# Patient Record
Sex: MALE | Race: BLACK OR AFRICAN AMERICAN | NOT HISPANIC OR LATINO | URBAN - METROPOLITAN AREA
[De-identification: names, ages, dates, MRNs, and addresses within clinical notes are randomized per-mention and may not be internally consistent; named-entity substitution may affect disease eponyms.]

---

## 2023-03-01 ENCOUNTER — EMERGENCY (EMERGENCY)
Facility: HOSPITAL | Age: 28
LOS: 0 days | Discharge: HOME | End: 2023-03-01
Payer: OTHER MISCELLANEOUS

## 2023-03-01 VITALS
HEIGHT: 73 IN | DIASTOLIC BLOOD PRESSURE: 77 MMHG | WEIGHT: 250 LBS | TEMPERATURE: 98 F | SYSTOLIC BLOOD PRESSURE: 130 MMHG | HEART RATE: 72 BPM | RESPIRATION RATE: 19 BRPM | OXYGEN SATURATION: 97 %
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
## 2023-03-01 DIAGNOSIS — M25.531 PAIN IN RIGHT WRIST: ICD-10-CM
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
## 2023-03-01 DIAGNOSIS — M54.50 LOW BACK PAIN, UNSPECIFIED: ICD-10-CM
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
## 2023-03-01 DIAGNOSIS — M25.521 PAIN IN RIGHT ELBOW: ICD-10-CM

## 2023-03-01 DIAGNOSIS — M25.511 PAIN IN RIGHT SHOULDER: ICD-10-CM

## 2023-03-01 PROCEDURE — 73080 X-RAY EXAM OF ELBOW: CPT | Mod: 26,RT

## 2023-03-01 PROCEDURE — 99284 EMERGENCY DEPT VISIT MOD MDM: CPT

## 2023-03-01 PROCEDURE — 73030 X-RAY EXAM OF SHOULDER: CPT | Mod: 26,RT

## 2023-03-01 RX ORDER — ACETAMINOPHEN 500 MG
650 TABLET ORAL ONCE
Refills: 0 | Status: COMPLETED | OUTPATIENT
Start: 2023-03-01 | End: 2023-03-01

## 2023-03-01 RX ORDER — LIDOCAINE 4 G/100G
1 CREAM TOPICAL ONCE
Refills: 0 | Status: COMPLETED | OUTPATIENT
Start: 2023-03-01 | End: 2023-03-01

## 2023-03-01 RX ORDER — KETOROLAC TROMETHAMINE 30 MG/ML
30 SYRINGE (ML) INJECTION ONCE
Refills: 0 | Status: DISCONTINUED | OUTPATIENT
Start: 2023-03-01 | End: 2023-03-01

## 2023-03-01 RX ADMIN — LIDOCAINE 1 PATCH: 4 CREAM TOPICAL at 15:59

## 2023-03-01 RX ADMIN — Medication 650 MILLIGRAM(S): at 14:46

## 2023-03-01 RX ADMIN — Medication 30 MILLIGRAM(S): at 14:47

## 2023-03-01 NOTE — ED ADULT NURSE NOTE - NSIMPLEMENTINTERV_GEN_ALL_ED
Implemented All Fall Risk Interventions:  [place] to call system. Call bell, personal items and telephone within reach. Instruct patient to call for assistance. Room bathroom lighting operational. Non-slip footwear when patient is off stretcher. Physically safe environment: no spills, clutter or unnecessary equipment. Stretcher in lowest position, wheels locked, appropriate side rails in place. Provide visual cue, wrist band, yellow gown, etc. Monitor gait and stability. Monitor for mental status changes and reorient to person, place, and time. Review medications for side effects contributing to fall risk. Reinforce activity limits and safety measures with patient and family.

## 2023-03-01 NOTE — ED ADULT NURSE NOTE - OBJECTIVE STATEMENT
received er chair 6 c/o pain r shoulder r elbow r wrist and r lower back s/p slip and fall at work denies head injury or loc no swelling or deformity noted r fingers warm pink and mobile with brisk capillary refill and + sensation present full rom r upper extremity but with worse pain upon position injury occurred while pt was at work

## 2023-03-01 NOTE — ED PROVIDER NOTE - PATIENT PORTAL LINK FT
You can access the FollowMyHealth Patient Portal offered by [facility] by registering at the following website: [email or web address]. By joining [employer]’s FollowMyHealth portal, you will also be able to view your health information using other applications (apps) compatible with our system.

## 2023-03-01 NOTE — ED PROVIDER NOTE - NSFOLLOWUPINSTRUCTIONS_ED_ALL_ED_FT
You were seen today in the ER for shoulder and elbow pain after a slip and fall. The preliminary read on your xrays were negative for acute fracture or dislocation, if anything changes you will receive a phone call. Please avoid heavy lifting until symptoms improve. A lidocaine patch was placed for pain and can remain on for 12 hours.     Take Motrin 600 mg every 8 hours as needed for moderate pain-- take with food..    Take Tylenol 650mg (Two 325 mg pills) every 4-6 hours as needed for pain.    Rest, Ice, Elevate injured area    Return to Emergency room for any worsening or persistent pain, weakness, numbness, fever, color change to extremity, or any other concerning symptoms.    Advance activity as tolerated.     Follow up with your primary care physician in 48-72 hours- bring copies of your results. You were seen today in the ER for shoulder and elbow pain after a slip and fall.     The preliminary read on your xrays were negative for acute fracture or dislocation, if anything changes you will receive a phone call.     Please avoid heavy lifting until symptoms improve.     A lidocaine patch was placed for pain and can remain on for 12 hours.     Take Motrin 600 mg every 8 hours as needed for moderate pain-- take with food..    Take Tylenol 650mg (Two 325 mg pills) every 4-6 hours as needed for pain.    Rest, Ice, Elevate injured area    Return to Emergency room for any worsening or persistent pain, weakness, numbness, fever, color change to extremity, or any other concerning symptoms.    Advance activity as tolerated.     Follow up with your primary care physician in 48-72 hours- bring copies of your results.    If symptoms persist, you can follow up with orthopedics. See below for referral.

## 2023-03-01 NOTE — ED ADULT TRIAGE NOTE - CHIEF COMPLAINT QUOTE
Pt s/p slipped and fell on icy surface at work x today , pt c/o R shoulder,  R arm, R wrist, and lower back pain . denies LOC

## 2023-03-01 NOTE — ED PROVIDER NOTE - OBJECTIVE STATEMENT
Patient is 26yo M no significant PHMx presents with c/o right shoulder, elbow and wrist pain s/p slip and fall on ice at work. Patient also c/o right low back pain. Patient denies head strike or LOC, HA, dizziness, neck pain, CP, SOB, leg pain. Patient reports pain is mostly right anterior shoulder, worse with movement. Patient is 28yo M no significant PHMx presents with c/o right shoulder, elbow and wrist pain s/p slip and fall on ice at work. Patient also c/o right low back pain. Patient denies head strike or LOC, HA, dizziness, neck pain, CP, SOB, leg pain. Patient reports pain is mostly right anterior shoulder, worse with movement. Denies bowel or bladder incontinence or retention, saddle anesthesia, numbness, tingling or inability to ambulate.

## 2023-03-01 NOTE — ED ADULT NURSE NOTE - WAS YOUR LAST COVID-19 VACCINE GREATER THAN OR EQUAL TO TWO MONTHS AGO?
RN contacted patient and informed her message has been sent to MD to review CT for upcoming surgery. Will contact her with final results and recommendations. Patient verbalized understanding. Advised patient to call with questions, concerns, or changes in symptoms.      No

## 2023-03-01 NOTE — ED PROVIDER NOTE - CLINICAL SUMMARY MEDICAL DECISION MAKING FREE TEXT BOX
Patient is 26yo M no significant PHMx presents with c/o right shoulder, elbow and wrist pain, right mid low back pain s/p slip and fall on ice at work. Patient denies head strike or LOC, HA, dizziness, neck pain, CP, SOB, leg pain, urinary complaints or hematuria. Vitals sings stable. Physical exam pertinent for right anterior/lateral shoulder tenderness, patient with full ROM of wrist, elbow, and shoulder. No ecchymosis noted. Patient also with right lumbar paraspinal/flank tenderness. Concern for MSK injury vs contusion, less concern for bony injury or snuff box injury in setting of no hand pain, Full ROM. Will order right shoulder and elbow xray, PO Tylenol and IM Ketoralac for pain, lidocaine patch, and reassess. Patient likely to be discharged home with PMD follow up. Patient is 28yo M no significant PHMx presents with c/o right shoulder, elbow and wrist pain, right mid low back pain s/p slip and fall on ice at work. Patient denies head strike or LOC, HA, dizziness, neck pain, CP, SOB, leg pain, urinary complaints or hematuria. Vitals sings stable. Physical exam pertinent for right anterior/lateral shoulder tenderness, patient with full ROM of wrist, elbow, and shoulder. No ecchymosis noted. Patient also with right lumbar paraspinal tenderness. Concern for MSK injury vs contusion, less concern for bony injury, no snuff box tenderness, Full ROM. Will order right shoulder and elbow xray, PO Tylenol and IM Ketoralac for pain, lidocaine patch, and reassess. Patient likely to be discharged home with PMD follow up.

## 2023-03-01 NOTE — ED PROVIDER NOTE - CARE PROVIDER_API CALL
Timmy Rojas)  [facility]  [address] [place] [address], [place] [address]  Phone: [phone]  Fax: [phone]  Follow Up Time:

## 2023-03-01 NOTE — ED PROVIDER NOTE - NSFOLLOWUPCLINICS_GEN_ALL_ED_FT
[facility]  Orthopedic Surgery  [address], 3rd & 4th [place] [address]  Phone: [phone]  Fax:     [facility]  Orthopedic Surgery  [address] [address]  Phone: [phone]  Fax:

## 2024-07-29 ENCOUNTER — EMERGENCY (EMERGENCY)
Facility: HOSPITAL | Age: 29
LOS: 1 days | Discharge: HOME | End: 2024-07-29
Attending: STUDENT IN AN ORGANIZED HEALTH CARE EDUCATION/TRAINING PROGRAM
Payer: SELF-PAY

## 2024-07-29 VITALS
OXYGEN SATURATION: 96 % | SYSTOLIC BLOOD PRESSURE: 135 MMHG | DIASTOLIC BLOOD PRESSURE: 76 MMHG | TEMPERATURE: 98 F | HEART RATE: 62 BPM | RESPIRATION RATE: 18 BRPM

## 2024-07-29 VITALS
HEIGHT: 74 IN | HEART RATE: 68 BPM | TEMPERATURE: 98 F | WEIGHT: 250 LBS | SYSTOLIC BLOOD PRESSURE: 141 MMHG | OXYGEN SATURATION: 94 % | RESPIRATION RATE: 20 BRPM | DIASTOLIC BLOOD PRESSURE: 82 MMHG

## 2024-07-29 RX ORDER — LIDOCAINE HCL 28 MG/G
1 GEL TOPICAL ONCE
Refills: 0 | Status: COMPLETED | OUTPATIENT
Start: 2024-07-29 | End: 2024-07-29

## 2024-07-29 RX ORDER — ACETAMINOPHEN 325 MG
975 TABLET ORAL ONCE
Refills: 0 | Status: COMPLETED | OUTPATIENT
Start: 2024-07-29 | End: 2024-07-29

## 2024-07-29 RX ADMIN — Medication 600 MILLIGRAM(S): at 02:27

## 2024-07-29 RX ADMIN — LIDOCAINE HCL 1 PATCH: 28 GEL TOPICAL at 02:27

## 2024-07-29 RX ADMIN — Medication 10 MILLIGRAM(S): at 02:27

## 2024-07-29 RX ADMIN — Medication 975 MILLIGRAM(S): at 02:06
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
## 2024-07-29 NOTE — ED PROVIDER NOTE - PHYSICAL EXAMINATION
GENERAL: NAD, lying in bed comfortably, C-collar in place   HEAD:  Atraumatic, Normocephalic  EYES: EOMI, PERRLA, conjunctiva and sclera clear  ENT: Moist mucous membranes  NECK: Tenderness just lateral to C1 on left side, no midline tenderness   CHEST/LUNG: Clear to auscultation bilaterally  HEART: Regular rate and rhythm  ABDOMEN: Soft, nontender, nondistended  EXTREMITIES:  2+ Peripheral Pulses, brisk capillary refill  NERVOUS SYSTEM:  A&Ox3, no focal deficits   SKIN: No rashes or lesions
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
## 2024-07-29 NOTE — ED PROVIDER NOTE - PATIENT PORTAL LINK FT
You can access the FollowMyHealth Patient Portal offered by [facility] by registering at the following website: [email or web address]. By joining [employer]’s FollowMyHealth portal, you will also be able to view your health information using other applications (apps) compatible with our system.

## 2025-02-08 NOTE — ED PROVIDER NOTE - WR ORDER DATE AND TIME 2
Copied from CRM #[id number]. Topic: MW Clinical Concerns - MW Medical Question  >> Feb 8, 2025  2:59 PM Connie GAR wrote:  JOI called during After Hrs to ask questions about results.  Selected 'Wrap Up CRM' and created new Telephone Encounter after clicking 'Convert to Clinical Call'. Selected Reason for Call 'Results'. Sent Pt Message template and routed as routine priority per Care Site Dept KB page for Routine Triage After Hrs support to appropriate clinical pool.Readback full message.    -- DO NOT REPLY / DO NOT REPLY ALL --  -- This inbox is not monitored. If this was sent to the wrong provider or department, reroute message to P ECO Reroute pool. --  -- Message is from Engagement Center Operations (ECO) --    General Patient Message: MOM CALLING due to the results she read on the live well elizabeth ,  2 questions regarding patients diagnoses and medication mom would like for the patient to be prescribed the tamiflu and for it to be sent too [id number] on [facility] .     Please advice    Caller Information       Contact Date/Time Type Contact Phone/Fax    02/08/2025 02:55 PM CST Phone (Incoming) [phone]            Alternative phone number: na     Can a detailed message be left? Yes - LiveWell Message  and Yes - Voicemail   Patient has been advised the message will be addressed within 2-3 business days.                 01-Mar-2023 14:25

## 2025-04-07 ENCOUNTER — TRANSCRIPTION ENCOUNTER (OUTPATIENT)
Age: 30
End: 2025-04-07

## 2025-04-07 ENCOUNTER — INPATIENT (INPATIENT)
Facility: HOSPITAL | Age: 30
LOS: 2 days | Discharge: HOME | End: 2025-04-10
Attending: STUDENT IN AN ORGANIZED HEALTH CARE EDUCATION/TRAINING PROGRAM | Admitting: STUDENT IN AN ORGANIZED HEALTH CARE EDUCATION/TRAINING PROGRAM
Payer: OTHER MISCELLANEOUS

## 2025-04-07 VITALS
SYSTOLIC BLOOD PRESSURE: 128 MMHG | TEMPERATURE: 98 F | OXYGEN SATURATION: 97 % | HEART RATE: 63 BPM | DIASTOLIC BLOOD PRESSURE: 77 MMHG | WEIGHT: 259.93 LBS | RESPIRATION RATE: 17 BRPM

## 2025-04-07 DIAGNOSIS — S02.402A ZYGOMATIC FRACTURE, UNSPECIFIED SIDE, INITIAL ENCOUNTER FOR CLOSED FRACTURE: ICD-10-CM

## 2025-04-07 LAB
ALBUMIN SERPL ELPH-MCNC: 4.1 G/DL — SIGNIFICANT CHANGE UP (ref 3.3–5)
ALP SERPL-CCNC: 84 U/L — SIGNIFICANT CHANGE UP (ref 40–120)
ALT FLD-CCNC: 29 U/L — SIGNIFICANT CHANGE UP (ref 4–41)
ANION GAP SERPL CALC-SCNC: 11 MMOL/L — SIGNIFICANT CHANGE UP (ref 7–14)
APTT BLD: 34.4 SEC — SIGNIFICANT CHANGE UP (ref 24.5–35.6)
AST SERPL-CCNC: 27 U/L — SIGNIFICANT CHANGE UP (ref 4–40)
BASOPHILS # BLD AUTO: 0.05 K/UL — SIGNIFICANT CHANGE UP (ref 0–0.2)
BASOPHILS NFR BLD AUTO: 1 % — SIGNIFICANT CHANGE UP (ref 0–2)
BILIRUB SERPL-MCNC: 0.4 MG/DL — SIGNIFICANT CHANGE UP (ref 0.2–1.2)
BUN SERPL-MCNC: 12 MG/DL — SIGNIFICANT CHANGE UP (ref 7–23)
CALCIUM SERPL-MCNC: 9.8 MG/DL — SIGNIFICANT CHANGE UP (ref 8.4–10.5)
CHLORIDE SERPL-SCNC: 102 MMOL/L — SIGNIFICANT CHANGE UP (ref 98–107)
CO2 SERPL-SCNC: 27 MMOL/L — SIGNIFICANT CHANGE UP (ref 22–31)
CREAT SERPL-MCNC: 1.2 MG/DL — SIGNIFICANT CHANGE UP (ref 0.5–1.3)
EGFR: 84 ML/MIN/1.73M2 — SIGNIFICANT CHANGE UP
EGFR: 84 ML/MIN/1.73M2 — SIGNIFICANT CHANGE UP
EOSINOPHIL # BLD AUTO: 0.06 K/UL — SIGNIFICANT CHANGE UP (ref 0–0.5)
EOSINOPHIL NFR BLD AUTO: 1.2 % — SIGNIFICANT CHANGE UP (ref 0–6)
GLUCOSE SERPL-MCNC: 108 MG/DL — HIGH (ref 70–99)
HCT VFR BLD CALC: 47.6 % — SIGNIFICANT CHANGE UP (ref 39–50)
HGB BLD-MCNC: 15.8 G/DL — SIGNIFICANT CHANGE UP (ref 13–17)
IANC: 2.64 K/UL — SIGNIFICANT CHANGE UP (ref 1.8–7.4)
IMM GRANULOCYTES NFR BLD AUTO: 0.2 % — SIGNIFICANT CHANGE UP (ref 0–0.9)
INR BLD: 1.05 RATIO — SIGNIFICANT CHANGE UP (ref 0.85–1.16)
LYMPHOCYTES # BLD AUTO: 1.78 K/UL — SIGNIFICANT CHANGE UP (ref 1–3.3)
LYMPHOCYTES # BLD AUTO: 36 % — SIGNIFICANT CHANGE UP (ref 13–44)
MCHC RBC-ENTMCNC: 28.6 PG — SIGNIFICANT CHANGE UP (ref 27–34)
MCHC RBC-ENTMCNC: 33.2 G/DL — SIGNIFICANT CHANGE UP (ref 32–36)
MCV RBC AUTO: 86.1 FL — SIGNIFICANT CHANGE UP (ref 80–100)
MONOCYTES # BLD AUTO: 0.4 K/UL — SIGNIFICANT CHANGE UP (ref 0–0.9)
MONOCYTES NFR BLD AUTO: 8.1 % — SIGNIFICANT CHANGE UP (ref 2–14)
NEUTROPHILS # BLD AUTO: 2.64 K/UL — SIGNIFICANT CHANGE UP (ref 1.8–7.4)
NEUTROPHILS NFR BLD AUTO: 53.5 % — SIGNIFICANT CHANGE UP (ref 43–77)
NRBC # BLD AUTO: 0 K/UL — SIGNIFICANT CHANGE UP (ref 0–0)
NRBC # FLD: 0 K/UL — SIGNIFICANT CHANGE UP (ref 0–0)
NRBC BLD AUTO-RTO: 0 /100 WBCS — SIGNIFICANT CHANGE UP (ref 0–0)
PLATELET # BLD AUTO: 256 K/UL — SIGNIFICANT CHANGE UP (ref 150–400)
POTASSIUM SERPL-MCNC: 4 MMOL/L — SIGNIFICANT CHANGE UP (ref 3.5–5.3)
POTASSIUM SERPL-SCNC: 4 MMOL/L — SIGNIFICANT CHANGE UP (ref 3.5–5.3)
PROT SERPL-MCNC: 7.1 G/DL — SIGNIFICANT CHANGE UP (ref 6–8.3)
PROTHROM AB SERPL-ACNC: 12.2 SEC — SIGNIFICANT CHANGE UP (ref 9.9–13.4)
RBC # BLD: 5.53 M/UL — SIGNIFICANT CHANGE UP (ref 4.2–5.8)
RBC # FLD: 12.3 % — SIGNIFICANT CHANGE UP (ref 10.3–14.5)
SODIUM SERPL-SCNC: 140 MMOL/L — SIGNIFICANT CHANGE UP (ref 135–145)
WBC # BLD: 4.94 K/UL — SIGNIFICANT CHANGE UP (ref 3.8–10.5)
WBC # FLD AUTO: 4.94 K/UL — SIGNIFICANT CHANGE UP (ref 3.8–10.5)

## 2025-04-07 PROCEDURE — 72125 CT NECK SPINE W/O DYE: CPT | Mod: 26

## 2025-04-07 PROCEDURE — 70486 CT MAXILLOFACIAL W/O DYE: CPT | Mod: 26

## 2025-04-07 PROCEDURE — 70450 CT HEAD/BRAIN W/O DYE: CPT | Mod: 26

## 2025-04-07 PROCEDURE — 99285 EMERGENCY DEPT VISIT HI MDM: CPT

## 2025-04-07 RX ORDER — DEXAMETHASONE 0.5 MG/1
12 TABLET ORAL ONCE
Refills: 0 | Status: COMPLETED | OUTPATIENT
Start: 2025-04-07 | End: 2025-04-08

## 2025-04-07 RX ORDER — SODIUM CHLORIDE 9 G/1000ML
1000 INJECTION, SOLUTION INTRAVENOUS
Refills: 0 | Status: DISCONTINUED | OUTPATIENT
Start: 2025-04-07 | End: 2025-04-10

## 2025-04-07 RX ORDER — AMPICILLIN SODIUM AND SULBACTAM SODIUM 1; .5 G/1; G/1
3 INJECTION, POWDER, FOR SOLUTION INTRAMUSCULAR; INTRAVENOUS EVERY 6 HOURS
Refills: 0 | Status: DISCONTINUED | OUTPATIENT
Start: 2025-04-07 | End: 2025-04-08

## 2025-04-07 RX ORDER — IBUPROFEN 200 MG
600 TABLET ORAL EVERY 6 HOURS
Refills: 0 | Status: DISCONTINUED | OUTPATIENT
Start: 2025-04-07 | End: 2025-04-10

## 2025-04-07 RX ORDER — ACETAMINOPHEN 500 MG/5ML
975 LIQUID (ML) ORAL ONCE
Refills: 0 | Status: COMPLETED | OUTPATIENT
Start: 2025-04-07 | End: 2025-04-07

## 2025-04-07 RX ORDER — KETOROLAC TROMETHAMINE 30 MG/ML
15 INJECTION, SOLUTION INTRAMUSCULAR; INTRAVENOUS ONCE
Refills: 0 | Status: DISCONTINUED | OUTPATIENT
Start: 2025-04-07 | End: 2025-04-07

## 2025-04-07 RX ORDER — OXYCODONE HYDROCHLORIDE 30 MG/1
10 TABLET ORAL EVERY 4 HOURS
Refills: 0 | Status: DISCONTINUED | OUTPATIENT
Start: 2025-04-07 | End: 2025-04-10

## 2025-04-07 RX ORDER — ONDANSETRON HCL/PF 4 MG/2 ML
4 VIAL (ML) INJECTION EVERY 8 HOURS
Refills: 0 | Status: DISCONTINUED | OUTPATIENT
Start: 2025-04-07 | End: 2025-04-10

## 2025-04-07 RX ORDER — OXYCODONE HYDROCHLORIDE 30 MG/1
5 TABLET ORAL EVERY 4 HOURS
Refills: 0 | Status: DISCONTINUED | OUTPATIENT
Start: 2025-04-07 | End: 2025-04-10

## 2025-04-07 RX ORDER — OXYCODONE HYDROCHLORIDE 30 MG/1
5 TABLET ORAL ONCE
Refills: 0 | Status: DISCONTINUED | OUTPATIENT
Start: 2025-04-07 | End: 2025-04-07

## 2025-04-07 RX ORDER — ACETAMINOPHEN 500 MG/5ML
650 LIQUID (ML) ORAL EVERY 6 HOURS
Refills: 0 | Status: DISCONTINUED | OUTPATIENT
Start: 2025-04-07 | End: 2025-04-10

## 2025-04-07 RX ORDER — ENOXAPARIN SODIUM 100 MG/ML
40 INJECTION SUBCUTANEOUS EVERY 24 HOURS
Refills: 0 | Status: DISCONTINUED | OUTPATIENT
Start: 2025-04-07 | End: 2025-04-10

## 2025-04-07 RX ADMIN — AMPICILLIN SODIUM AND SULBACTAM SODIUM 200 GRAM(S): 1; .5 INJECTION, POWDER, FOR SOLUTION INTRAMUSCULAR; INTRAVENOUS at 23:58

## 2025-04-07 RX ADMIN — OXYCODONE HYDROCHLORIDE 10 MILLIGRAM(S): 30 TABLET ORAL at 20:58

## 2025-04-07 RX ADMIN — OXYCODONE HYDROCHLORIDE 10 MILLIGRAM(S): 30 TABLET ORAL at 21:28

## 2025-04-07 RX ADMIN — OXYCODONE HYDROCHLORIDE 5 MILLIGRAM(S): 30 TABLET ORAL at 14:52

## 2025-04-07 RX ADMIN — Medication 600 MILLIGRAM(S): at 23:57

## 2025-04-07 RX ADMIN — Medication 975 MILLIGRAM(S): at 14:52

## 2025-04-07 RX ADMIN — Medication 650 MILLIGRAM(S): at 23:58

## 2025-04-07 RX ADMIN — KETOROLAC TROMETHAMINE 15 MILLIGRAM(S): 30 INJECTION, SOLUTION INTRAMUSCULAR; INTRAVENOUS at 14:55
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
## 2025-04-07 NOTE — ED PROVIDER NOTE - PHYSICAL EXAMINATION
Unique COURTNEY:  VITALS: Initial triage and subsequent vitals have been reviewed by me.  GEN APPEARANCE: Alert, cooperative. Non-toxic appearing. Well appearing. NAD.  HEAD: Atraumatic, normocephalic L maxillary sinus tenderness mild trismus no tooth laxity.   EYES: PERRLa, EOMI, vision grossly intact.   EARS: Gross hearing intact.   NOSE: No nasal discharge, no external evidence of epistaxis.   NECK: Supple  CV: RRR, S1S2, no c/r/m/g. No cyanosis. Extremities warm, well perfused. Cap refill <2 seconds. No bruits.   LUNGS: CTAB. No wheezing. No rales. No rhonchi. No diminished breath sounds.   ABDOMEN: Soft, NTND. No guarding or rebound. No masses.   MSK/EXT: Spine appears normal, no spine point tenderness. No CVA ttp. Normal muscular development. Pelvis stable. No obvious joint or bony deformity, no peripheral edema.   NEURO: Alert, follows commands. Weight bearing normal. Speech normal. Sensation and motor normal x4 extremities.   SKIN: Normal color for race, warm, dry and intact. No evidence of rash.  PSYCH: Normal mood and affect.
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
## 2025-04-07 NOTE — PATIENT PROFILE ADULT - ARE SIGNIFICANT INDICATORS COMPLETE.
This patient has been assessed with a concern for Malnutrition and was treated during this hospitalization for the following Nutrition diagnosis/diagnoses:     -  12/10/2024: Severe protein-calorie malnutrition  
Yes
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
## 2025-04-07 NOTE — H&P ADULT - NSHPPHYSICALEXAM_GEN_ALL_CORE
Gen: Alert and awake. Cooperative. NAD  Head: Atraumatic, left sided indentation noted over the right. L zygoma w/ TTP. Mild trismus d/t pain. Dentition intact.   Eyes: PERRL, EOMI, vision intact  Ears: No otorrhea, hearing intact.   Nose: No asymmetry, no discharge, no epistasis or septal hematoma.   Neck: Supple, no JVD.   CV: RRR, S1S2 present.   Lungs: No wheezing, rhonchi or rales.   Abdomen: Soft and distended. No rebound or masses.   Skin: Keloid-like scar to the upper back.   Psych: Normal mood and affect.
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
## 2025-04-07 NOTE — ED PROVIDER NOTE - ATTENDING CONTRIBUTION TO CARE
I have personally performed a face to face medical and diagnostic evaluation of the patient. I have discussed with and reviewed the Fellow's note and agree with the History, ROS, Physical Exam and MDM unless otherwise indicated. A brief summary of my personal evaluation and impression can be found below.    Unique COURTNEY: Please see the HPI, ROS, PE and MDM as authored by me.
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
## 2025-04-07 NOTE — H&P ADULT - NSHPLABSRESULTS_GEN_ALL_CORE
LABS:                  Radiology:     FINDINGS:    CT BRAIN:    There is no acute intracranial mass-effect, hemorrhage, midline shift, or abnormal extra-axial fluid collection. Ventricles, sulci, and cisterns are normal in size without hydrocephalus. Basal cisterns are patent. The calvarium is intact.    CT CERVICAL SPINE:    Cervical lordosis is maintained. There is no prevertebral soft tissue swelling. Vertebral body heights and alignment are maintained.    The atlantodental and atlanto-occipital joints are maintained. Articular facets and posterior elements alignment is maintained.    The partially imaged lung apices are clear.    CT FACE:    Acute comminuted depressed tripartite fracture of the left zygomatic arch is evident (series 6 image 97).    Chronic left nasal fracture noted. There is no orbital fracture. The globes are intact without retrobulbar hematoma. The lenses are located bilaterally.    The mandibular condyles are located without fracture. The temporomandibular joints are intact.    The paranasal sinuses and mastoid air cells are clear. The nasopharyngeal contours are within normal limits.    IMPRESSION:    CT BRAIN: No acute intracranial bleeding.    CT CERVICAL SPINE: No fracture.    CT FACE: Acute depressed and comminuted left zygomatic arch fracture.
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
## 2025-04-08 LAB
ANION GAP SERPL CALC-SCNC: 12 MMOL/L — SIGNIFICANT CHANGE UP (ref 7–14)
APTT BLD: 33.3 SEC — SIGNIFICANT CHANGE UP (ref 24.5–35.6)
BUN SERPL-MCNC: 14 MG/DL — SIGNIFICANT CHANGE UP (ref 7–23)
CALCIUM SERPL-MCNC: 9.3 MG/DL — SIGNIFICANT CHANGE UP (ref 8.4–10.5)
CHLORIDE SERPL-SCNC: 102 MMOL/L — SIGNIFICANT CHANGE UP (ref 98–107)
CO2 SERPL-SCNC: 24 MMOL/L — SIGNIFICANT CHANGE UP (ref 22–31)
CREAT SERPL-MCNC: 1.23 MG/DL — SIGNIFICANT CHANGE UP (ref 0.5–1.3)
EGFR: 82 ML/MIN/1.73M2 — SIGNIFICANT CHANGE UP
EGFR: 82 ML/MIN/1.73M2 — SIGNIFICANT CHANGE UP
GLUCOSE SERPL-MCNC: 97 MG/DL — SIGNIFICANT CHANGE UP (ref 70–99)
HCT VFR BLD CALC: 43.6 % — SIGNIFICANT CHANGE UP (ref 39–50)
HGB BLD-MCNC: 14.6 G/DL — SIGNIFICANT CHANGE UP (ref 13–17)
INR BLD: 0.97 RATIO — SIGNIFICANT CHANGE UP (ref 0.85–1.16)
MAGNESIUM SERPL-MCNC: 2 MG/DL — SIGNIFICANT CHANGE UP (ref 1.6–2.6)
MCHC RBC-ENTMCNC: 28.4 PG — SIGNIFICANT CHANGE UP (ref 27–34)
MCHC RBC-ENTMCNC: 33.5 G/DL — SIGNIFICANT CHANGE UP (ref 32–36)
MCV RBC AUTO: 84.8 FL — SIGNIFICANT CHANGE UP (ref 80–100)
NRBC # BLD AUTO: 0 K/UL — SIGNIFICANT CHANGE UP (ref 0–0)
NRBC # FLD: 0 K/UL — SIGNIFICANT CHANGE UP (ref 0–0)
NRBC BLD AUTO-RTO: 0 /100 WBCS — SIGNIFICANT CHANGE UP (ref 0–0)
PHOSPHATE SERPL-MCNC: 4.6 MG/DL — HIGH (ref 2.5–4.5)
PLATELET # BLD AUTO: 236 K/UL — SIGNIFICANT CHANGE UP (ref 150–400)
POTASSIUM SERPL-MCNC: 4.1 MMOL/L — SIGNIFICANT CHANGE UP (ref 3.5–5.3)
POTASSIUM SERPL-SCNC: 4.1 MMOL/L — SIGNIFICANT CHANGE UP (ref 3.5–5.3)
PROTHROM AB SERPL-ACNC: 11.5 SEC — SIGNIFICANT CHANGE UP (ref 9.9–13.4)
RBC # BLD: 5.14 M/UL — SIGNIFICANT CHANGE UP (ref 4.2–5.8)
RBC # FLD: 12.2 % — SIGNIFICANT CHANGE UP (ref 10.3–14.5)
SODIUM SERPL-SCNC: 138 MMOL/L — SIGNIFICANT CHANGE UP (ref 135–145)
WBC # BLD: 4.82 K/UL — SIGNIFICANT CHANGE UP (ref 3.8–10.5)
WBC # FLD AUTO: 4.82 K/UL — SIGNIFICANT CHANGE UP (ref 3.8–10.5)

## 2025-04-08 RX ORDER — INFLUENZA A VIRUS A/IDAHO/07/2018 (H1N1) ANTIGEN (MDCK CELL DERIVED, PROPIOLACTONE INACTIVATED, INFLUENZA A VIRUS A/INDIANA/08/2018 (H3N2) ANTIGEN (MDCK CELL DERIVED, PROPIOLACTONE INACTIVATED), INFLUENZA B VIRUS B/SINGAPORE/INFTT-16-0610/2016 ANTIGEN (MDCK CELL DERIVED, PROPIOLACTONE INACTIVATED), INFLUENZA B VIRUS B/IOWA/06/2017 ANTIGEN (MDCK CELL DERIVED, PROPIOLACTONE INACTIVATED) 15; 15; 15; 15 UG/.5ML; UG/.5ML; UG/.5ML; UG/.5ML
0.5 INJECTION, SUSPENSION INTRAMUSCULAR ONCE
Refills: 0 | Status: DISCONTINUED | OUTPATIENT
Start: 2025-04-08 | End: 2025-04-10

## 2025-04-08 RX ADMIN — Medication 650 MILLIGRAM(S): at 18:06

## 2025-04-08 RX ADMIN — Medication 650 MILLIGRAM(S): at 06:19

## 2025-04-08 RX ADMIN — OXYCODONE HYDROCHLORIDE 10 MILLIGRAM(S): 30 TABLET ORAL at 09:19

## 2025-04-08 RX ADMIN — SODIUM CHLORIDE 100 MILLILITER(S): 9 INJECTION, SOLUTION INTRAVENOUS at 08:52

## 2025-04-08 RX ADMIN — Medication 650 MILLIGRAM(S): at 11:20

## 2025-04-08 RX ADMIN — Medication 600 MILLIGRAM(S): at 22:35

## 2025-04-08 RX ADMIN — SODIUM CHLORIDE 100 MILLILITER(S): 9 INJECTION, SOLUTION INTRAVENOUS at 00:37

## 2025-04-08 RX ADMIN — OXYCODONE HYDROCHLORIDE 10 MILLIGRAM(S): 30 TABLET ORAL at 08:52

## 2025-04-08 RX ADMIN — Medication 600 MILLIGRAM(S): at 06:19

## 2025-04-08 RX ADMIN — Medication 600 MILLIGRAM(S): at 22:37

## 2025-04-08 RX ADMIN — Medication 600 MILLIGRAM(S): at 14:36

## 2025-04-08 RX ADMIN — DEXAMETHASONE 102.4 MILLIGRAM(S): 0.5 TABLET ORAL at 08:57

## 2025-04-08 RX ADMIN — AMPICILLIN SODIUM AND SULBACTAM SODIUM 200 GRAM(S): 1; .5 INJECTION, POWDER, FOR SOLUTION INTRAMUSCULAR; INTRAVENOUS at 06:19

## 2025-04-08 NOTE — PROGRESS NOTE ADULT - SUBJECTIVE AND OBJECTIVE BOX
ORAL & MAXILLOFACIAL SURGERY PROGRESS NOTE    Zygomatic fracture, unspecified side, initial encounter for closed fracture        ERI PUCKETT  |  [id number]      Patient is a 29y Male admitted for L zygomatic arch fx.       S: RAHEL overnight. Pt seen and evaluated bedside this AM. Pt resting comfortably on exam. NPO. Ambulating and voiding without issue. Pain well controlled.    MEDICATIONS  (STANDING):  acetaminophen     Tablet .. 650 milliGRAM(s) Oral every 6 hours  enoxaparin Injectable 40 milliGRAM(s) SubCutaneous every 24 hours  ibuprofen  Tablet. 600 milliGRAM(s) Oral every 6 hours  influenza   Vaccine 0.5 milliLiter(s) IntraMuscular once  lactated ringers. 1000 milliLiter(s) (100 mL/Hr) IV Continuous <Continuous>    MEDICATIONS  (PRN):  ondansetron    Tablet 4 milliGRAM(s) Oral every 8 hours PRN Nausea and/or Vomiting  oxyCODONE    IR 5 milliGRAM(s) Oral every 4 hours PRN Moderate Pain (4 - 6)  oxyCODONE    IR 10 milliGRAM(s) Oral every 4 hours PRN Severe Pain (7 - 10)      O:   Vital Signs Last 24 Hrs  T(C): 36.6 (08 Apr 2025 09:21), Max: 36.8 (07 Apr 2025 16:36)  T(F): 97.9 (08 Apr 2025 09:21), Max: 98.3 (07 Apr 2025 16:36)  HR: 65 (08 Apr 2025 09:45) (50 - 90)  BP: 142/80 (08 Apr 2025 09:21) (132/74 - 151/72)  BP(mean): 98 (07 Apr 2025 16:36) (98 - 98)  RR: 18 (08 Apr 2025 09:21) (17 - 19)  SpO2: 98% (08 Apr 2025 09:21) (96% - 99%)    Parameters below as of 08 Apr 2025 05:30  Patient On (Oxygen Delivery Method): room air        PHYSICAL EXAM:  Gen: Alert and awake. Cooperative. NAD  Head: Atraumatic, left sided indentation noted over the right. L zygoma w/ TTP. Mild trismus d/t pain. Dentition intact.   Eyes: PERRL, EOMI, vision intact  Ears: No otorrhea, hearing intact.   Nose: No asymmetry, no discharge, no epistasis or septal hematoma.   Neck: Supple, no JVD.   CV: RRR, S1S2 present.   Lungs: No wheezing, rhonchi or rales.   Abdomen: Soft and distended. No rebound or masses.   Skin: Keloid-like scar to the upper back.   Psych: Normal mood and affect.                          14.6   4.82  )-----------( 236      ( 08 Apr 2025 05:29 )             43.6     04-08    138  |  102  |  14  ----------------------------<  97  4.1   |  24  |  1.23    Ca    9.3      08 Apr 2025 05:29  Phos  4.6     04-08  Mg     2.00     04-08    TPro  7.1  /  Alb  4.1  /  TBili  0.4  /  DBili  x   /  AST  27  /  ALT  29  /  AlkPhos  84  04-07 04-07-25 @ 07:01  -  04-08-25 @ 07:00  --------------------------------------------------------  IN: 750 mL / OUT: 0 mL / NET: 750 mL    04-08-25 @ 07:01  -  04-08-25 @ 13:00  --------------------------------------------------------  IN: 450 mL / OUT: 0 mL / NET: 450 mL        IMAGING STUDIES:

## 2025-04-09 LAB
ANION GAP SERPL CALC-SCNC: 13 MMOL/L — SIGNIFICANT CHANGE UP (ref 7–14)
APTT BLD: 31 SEC — SIGNIFICANT CHANGE UP (ref 24.5–35.6)
BUN SERPL-MCNC: 16 MG/DL — SIGNIFICANT CHANGE UP (ref 7–23)
CALCIUM SERPL-MCNC: 9 MG/DL — SIGNIFICANT CHANGE UP (ref 8.4–10.5)
CHLORIDE SERPL-SCNC: 102 MMOL/L — SIGNIFICANT CHANGE UP (ref 98–107)
CO2 SERPL-SCNC: 23 MMOL/L — SIGNIFICANT CHANGE UP (ref 22–31)
CREAT SERPL-MCNC: 1.03 MG/DL — SIGNIFICANT CHANGE UP (ref 0.5–1.3)
EGFR: 101 ML/MIN/1.73M2 — SIGNIFICANT CHANGE UP
EGFR: 101 ML/MIN/1.73M2 — SIGNIFICANT CHANGE UP
GLUCOSE SERPL-MCNC: 110 MG/DL — HIGH (ref 70–99)
HCT VFR BLD CALC: 42.7 % — SIGNIFICANT CHANGE UP (ref 39–50)
HGB BLD-MCNC: 14.6 G/DL — SIGNIFICANT CHANGE UP (ref 13–17)
INR BLD: 1.05 RATIO — SIGNIFICANT CHANGE UP (ref 0.85–1.16)
MAGNESIUM SERPL-MCNC: 1.9 MG/DL — SIGNIFICANT CHANGE UP (ref 1.6–2.6)
MCHC RBC-ENTMCNC: 28.8 PG — SIGNIFICANT CHANGE UP (ref 27–34)
MCHC RBC-ENTMCNC: 34.2 G/DL — SIGNIFICANT CHANGE UP (ref 32–36)
MCV RBC AUTO: 84.2 FL — SIGNIFICANT CHANGE UP (ref 80–100)
NRBC # BLD AUTO: 0 K/UL — SIGNIFICANT CHANGE UP (ref 0–0)
NRBC # FLD: 0 K/UL — SIGNIFICANT CHANGE UP (ref 0–0)
NRBC BLD AUTO-RTO: 0 /100 WBCS — SIGNIFICANT CHANGE UP (ref 0–0)
PHOSPHATE SERPL-MCNC: 4.1 MG/DL — SIGNIFICANT CHANGE UP (ref 2.5–4.5)
PLATELET # BLD AUTO: 255 K/UL — SIGNIFICANT CHANGE UP (ref 150–400)
POTASSIUM SERPL-MCNC: 3.9 MMOL/L — SIGNIFICANT CHANGE UP (ref 3.5–5.3)
POTASSIUM SERPL-SCNC: 3.9 MMOL/L — SIGNIFICANT CHANGE UP (ref 3.5–5.3)
PROTHROM AB SERPL-ACNC: 12.2 SEC — SIGNIFICANT CHANGE UP (ref 9.9–13.4)
RBC # BLD: 5.07 M/UL — SIGNIFICANT CHANGE UP (ref 4.2–5.8)
RBC # FLD: 12.2 % — SIGNIFICANT CHANGE UP (ref 10.3–14.5)
SODIUM SERPL-SCNC: 138 MMOL/L — SIGNIFICANT CHANGE UP (ref 135–145)
WBC # BLD: 11.06 K/UL — HIGH (ref 3.8–10.5)
WBC # FLD AUTO: 11.06 K/UL — HIGH (ref 3.8–10.5)

## 2025-04-09 RX ORDER — HYDROMORPHONE/SOD CHLOR,ISO/PF 2 MG/10 ML
0.5 SYRINGE (ML) INJECTION
Refills: 0 | Status: DISCONTINUED | OUTPATIENT
Start: 2025-04-09 | End: 2025-04-10

## 2025-04-09 RX ORDER — ONDANSETRON HCL/PF 4 MG/2 ML
4 VIAL (ML) INJECTION ONCE
Refills: 0 | Status: DISCONTINUED | OUTPATIENT
Start: 2025-04-09 | End: 2025-04-10

## 2025-04-09 RX ORDER — ACETAMINOPHEN 500 MG/5ML
1 LIQUID (ML) ORAL
Qty: 28 | Refills: 0
Start: 2025-04-09 | End: 2025-04-15

## 2025-04-09 RX ORDER — AMOXICILLIN 500 MG/1
1 CAPSULE ORAL
Qty: 21 | Refills: 0
Start: 2025-04-09 | End: 2025-04-15

## 2025-04-09 RX ORDER — OXYCODONE HYDROCHLORIDE 30 MG/1
1 TABLET ORAL
Qty: 8 | Refills: 0
Start: 2025-04-09 | End: 2025-04-10

## 2025-04-09 RX ORDER — FENTANYL CITRATE-0.9 % NACL/PF 100MCG/2ML
25 SYRINGE (ML) INTRAVENOUS
Refills: 0 | Status: DISCONTINUED | OUTPATIENT
Start: 2025-04-09 | End: 2025-04-09

## 2025-04-09 RX ORDER — OXYCODONE HYDROCHLORIDE 30 MG/1
5 TABLET ORAL ONCE
Refills: 0 | Status: DISCONTINUED | OUTPATIENT
Start: 2025-04-09 | End: 2025-04-10

## 2025-04-09 RX ORDER — IBUPROFEN 200 MG
1 TABLET ORAL
Qty: 21 | Refills: 0
Start: 2025-04-09 | End: 2025-04-15

## 2025-04-09 RX ADMIN — Medication 0.5 MILLIGRAM(S): at 21:50

## 2025-04-09 RX ADMIN — Medication 650 MILLIGRAM(S): at 01:25

## 2025-04-09 RX ADMIN — Medication 650 MILLIGRAM(S): at 04:34

## 2025-04-09 RX ADMIN — Medication 0.5 MILLIGRAM(S): at 22:05

## 2025-04-09 RX ADMIN — Medication 0.5 MILLIGRAM(S): at 23:00

## 2025-04-09 RX ADMIN — Medication 0.5 MILLIGRAM(S): at 22:15

## 2025-04-09 NOTE — DISCHARGE NOTE PROVIDER - NSDCFUADDINST_GEN_ALL_CORE_FT
PAIN: You may continue to take Acetaminophen (Tylenol) and Ibuprofen over the counter for pain. You can alternate the two medications, giving one every 3 hours. We recommend taking the medications around the clock for the first few days at home after surgery. Then you can start taking them only as needed for pain. Take oxycodone only for pain not controlled with tylenol and ibuprofen alone.  WOUND CARE:  You may keep area clean, but don't head submerge underwater. Avoid sports that are contact sports.   ACTIVITY: No heavy lifting, straining, or vigorous activity until your follow-up appointment in 1 weeks.   NOTIFY US IF: There is any bleeding that does not stop, any pus draining from wound(s), any fever (over 100.5 F) or chills, persistent nausea/vomiting, persistent diarrhea, or if pain is not controlled on their discharge pain medications.  FOLLOW-UP: Please call the office and make an appointment to follow up with Dr Ramírez in 1 weeks.

## 2025-04-09 NOTE — DISCHARGE NOTE PROVIDER - NSDCMRMEDTOKEN_GEN_ALL_CORE_FT
acetaminophen 650 mg oral tablet, extended release: 1 tab(s) orally every 6 hours  amoxicillin 500 mg oral tablet: 1 tab(s) orally 3 times a day  ibuprofen 600 mg oral tablet: 1 tab(s) orally every 8 hours as needed for  moderate pain  oxyCODONE 5 mg oral tablet: 1 tab(s) orally 4 times a day as needed for  severe pain MDD: 20  Peridex 0.12% mucous membrane liquid: 15 milliliter(s) orally 2 times a day   cephalexin 500 mg oral tablet: 1 tab(s) orally 3 times a day  ibuprofen 600 mg oral tablet: 1 tab(s) orally every 6 hours Alternate with tylenol every 3 hours  oxyCODONE 5 mg oral tablet: 1 tab(s) orally every 4 hours as needed for  moderate pain To take only if experiencing breakthrough pain. MDD: 4  Tylenol 325 mg oral capsule: 2 cap(s) orally every 6 hours Alternate with ibuprofen every 3 hours

## 2025-04-09 NOTE — DISCHARGE NOTE PROVIDER - CARE PROVIDER_API CALL
Geovanni Ramírez  Oral/Maxillofacial Surgery  [address] [place] [address], [address] [place] [id number]  Phone: [phone]  Fax: [phone]  Follow Up Time: 1 week

## 2025-04-09 NOTE — PRE-OP CHECKLIST - TO WHOM
Fluconazole Counseling:  Patient counseled regarding adverse effects of fluconazole including but not limited to headache, diarrhea, nausea, upset stomach, liver function test abnormalities, taste disturbance, and stomach pain.  There is a rare possibility of liver failure that can occur when taking fluconazole.  The patient understands that monitoring of LFTs and kidney function test may be required, especially at baseline. The patient verbalized understanding of the proper use and possible adverse effects of fluconazole.  All of the patient's questions and concerns were addressed. Steven Mg RN

## 2025-04-09 NOTE — DISCHARGE NOTE PROVIDER - NSDCCPTREATMENT_GEN_ALL_CORE_FT
PRINCIPAL PROCEDURE  Procedure: ORIF, zygoma, left  Findings and Treatment:      PRINCIPAL PROCEDURE  Procedure: Open reduction, fracture, zygoma  Findings and Treatment:

## 2025-04-09 NOTE — DISCHARGE NOTE PROVIDER - HOSPITAL COURSE
04/07/2025: Admitted for L zygomatic arch fx, AVSS on RA.   04/08/2025: RALPH MCKEON on RA  04/09/2025: RALPH MCKEON on RA, OR for ORIF of L zygomatic arch fx, uneventful extubation.  04/10/2025: 04/07/2025: Admitted for L zygomatic arch fx, AVSS on RA.   04/08/2025: RALPH MCKEON on RA  04/09/2025: RALPH MCKEON on RA, OR for open reduction of L zygomatic arch fx, uneventful extubation.  04/10/2025:   04/07/2025: Admitted for L zygomatic arch fx, AVSS on RA.   04/08/2025: RALPH MCKEON on RA  04/09/2025: RALPH MCKEON on RA, OR for open reduction of L zygomatic arch fx, uneventful extubation.  04/10/2025: RALPH MCKEON on RA. Patient post operative day 1 of open reduction of Left zygomatic arch fracture, patient doing well, recovered uneventfully. Patient to be discharged home today.

## 2025-04-09 NOTE — PROGRESS NOTE ADULT - SUBJECTIVE AND OBJECTIVE BOX
ORAL & MAXILLOFACIAL SURGERY PROGRESS NOTE    Zygomatic fracture, unspecified side, initial encounter for closed fracture        ERI PUCKETT  |  [id number]      Patient is a 29y Male admitted for L zygomatic arch fx.       S: RAHEL overnight. Pt seen and evaluated bedside this AM. Pt resting comfortably on exam. NPO. Ambulating and voiding without issue. Pain well controlled.    MEDICATIONS  (STANDING):  acetaminophen     Tablet .. 650 milliGRAM(s) Oral every 6 hours  enoxaparin Injectable 40 milliGRAM(s) SubCutaneous every 24 hours  ibuprofen  Tablet. 600 milliGRAM(s) Oral every 6 hours  influenza   Vaccine 0.5 milliLiter(s) IntraMuscular once  lactated ringers. 1000 milliLiter(s) (100 mL/Hr) IV Continuous <Continuous>    MEDICATIONS  (PRN):  ondansetron    Tablet 4 milliGRAM(s) Oral every 8 hours PRN Nausea and/or Vomiting  oxyCODONE    IR 5 milliGRAM(s) Oral every 4 hours PRN Moderate Pain (4 - 6)  oxyCODONE    IR 10 milliGRAM(s) Oral every 4 hours PRN Severe Pain (7 - 10)      O:   Vital Signs Last 24 Hrs  T(C): 36.6 (08 Apr 2025 09:21), Max: 36.8 (07 Apr 2025 16:36)  T(F): 97.9 (08 Apr 2025 09:21), Max: 98.3 (07 Apr 2025 16:36)  HR: 65 (08 Apr 2025 09:45) (50 - 90)  BP: 142/80 (08 Apr 2025 09:21) (132/74 - 151/72)  BP(mean): 98 (07 Apr 2025 16:36) (98 - 98)  RR: 18 (08 Apr 2025 09:21) (17 - 19)  SpO2: 98% (08 Apr 2025 09:21) (96% - 99%)    Parameters below as of 08 Apr 2025 05:30  Patient On (Oxygen Delivery Method): room air        PHYSICAL EXAM:  Gen: Alert and awake. Cooperative. NAD  Head: Atraumatic, left sided indentation noted over the right. L zygoma w/ TTP. Mild trismus d/t pain. Dentition intact.   Eyes: PERRL, EOMI, vision intact  Ears: No otorrhea, hearing intact.   Nose: No asymmetry, no discharge, no epistasis or septal hematoma.   Neck: Supple, no JVD.   CV: RRR, S1S2 present.   Lungs: No wheezing, rhonchi or rales.   Abdomen: Soft and distended. No rebound or masses.   Skin: Keloid-like scar to the upper back.   Psych: Normal mood and affect.                          14.6   4.82  )-----------( 236      ( 08 Apr 2025 05:29 )             43.6     04-08    138  |  102  |  14  ----------------------------<  97  4.1   |  24  |  1.23    Ca    9.3      08 Apr 2025 05:29  Phos  4.6     04-08  Mg     2.00     04-08    TPro  7.1  /  Alb  4.1  /  TBili  0.4  /  DBili  x   /  AST  27  /  ALT  29  /  AlkPhos  84  04-07 04-07-25 @ 07:01  -  04-08-25 @ 07:00  --------------------------------------------------------  IN: 750 mL / OUT: 0 mL / NET: 750 mL    04-08-25 @ 07:01  -  04-08-25 @ 13:00  --------------------------------------------------------  IN: 450 mL / OUT: 0 mL / NET: 450 mL        IMAGING STUDIES: ORAL & MAXILLOFACIAL SURGERY PROGRESS NOTE    Zygomatic fracture, unspecified side, initial encounter for closed fracture        ERI PUCKETT  |  [id number]      Patient is a 29y Male admitted for L zygomatic arch fx.     Pt evaluated during rounds this morning.       S: RAHEL overnight. Pt seen and evaluated bedside this AM. Pt resting comfortably on exam. NPO. Ambulating and voiding without issue. Pain well controlled.    MEDICATIONS  (STANDING):  acetaminophen     Tablet .. 650 milliGRAM(s) Oral every 6 hours  enoxaparin Injectable 40 milliGRAM(s) SubCutaneous every 24 hours  ibuprofen  Tablet. 600 milliGRAM(s) Oral every 6 hours  influenza   Vaccine 0.5 milliLiter(s) IntraMuscular once  lactated ringers. 1000 milliLiter(s) (100 mL/Hr) IV Continuous <Continuous>    MEDICATIONS  (PRN):  ondansetron    Tablet 4 milliGRAM(s) Oral every 8 hours PRN Nausea and/or Vomiting  oxyCODONE    IR 5 milliGRAM(s) Oral every 4 hours PRN Moderate Pain (4 - 6)  oxyCODONE    IR 10 milliGRAM(s) Oral every 4 hours PRN Severe Pain (7 - 10)      O:   Vital Signs Last 24 Hrs  T(C): 36.6 (08 Apr 2025 09:21), Max: 36.8 (07 Apr 2025 16:36)  T(F): 97.9 (08 Apr 2025 09:21), Max: 98.3 (07 Apr 2025 16:36)  HR: 65 (08 Apr 2025 09:45) (50 - 90)  BP: 142/80 (08 Apr 2025 09:21) (132/74 - 151/72)  BP(mean): 98 (07 Apr 2025 16:36) (98 - 98)  RR: 18 (08 Apr 2025 09:21) (17 - 19)  SpO2: 98% (08 Apr 2025 09:21) (96% - 99%)    Parameters below as of 08 Apr 2025 05:30  Patient On (Oxygen Delivery Method): room air        PHYSICAL EXAM:  Gen: Alert and awake. Cooperative. NAD  Head: Atraumatic, left sided indentation noted over the right. L zygoma w/ TTP. Mild trismus d/t pain. Dentition intact.   Eyes: PERRL, EOMI, vision intact  Ears: No otorrhea, hearing intact.   Nose: No asymmetry, no discharge, no epistasis or septal hematoma.   Neck: Supple, no JVD.   CV: RRR, S1S2 present.   Lungs: No wheezing, rhonchi or rales.   Abdomen: Soft and distended. No rebound or masses.   Skin: Keloid-like scar to the upper back.   Psych: Normal mood and affect.                          14.6   4.82  )-----------( 236      ( 08 Apr 2025 05:29 )             43.6     04-08    138  |  102  |  14  ----------------------------<  97  4.1   |  24  |  1.23    Ca    9.3      08 Apr 2025 05:29  Phos  4.6     04-08  Mg     2.00     04-08    TPro  7.1  /  Alb  4.1  /  TBili  0.4  /  DBili  x   /  AST  27  /  ALT  29  /  AlkPhos  84  04-07 04-07-25 @ 07:01  -  04-08-25 @ 07:00  --------------------------------------------------------  IN: 750 mL / OUT: 0 mL / NET: 750 mL    04-08-25 @ 07:01  -  04-08-25 @ 13:00  --------------------------------------------------------  IN: 450 mL / OUT: 0 mL / NET: 450 mL        IMAGING STUDIES:

## 2025-04-10 ENCOUNTER — TRANSCRIPTION ENCOUNTER (OUTPATIENT)
Age: 30
End: 2025-04-10

## 2025-04-10 VITALS
OXYGEN SATURATION: 98 % | DIASTOLIC BLOOD PRESSURE: 69 MMHG | TEMPERATURE: 98 F | RESPIRATION RATE: 18 BRPM | HEART RATE: 59 BPM | SYSTOLIC BLOOD PRESSURE: 134 MMHG

## 2025-04-10 LAB
ANION GAP SERPL CALC-SCNC: 13 MMOL/L — SIGNIFICANT CHANGE UP (ref 7–14)
BUN SERPL-MCNC: 12 MG/DL — SIGNIFICANT CHANGE UP (ref 7–23)
CALCIUM SERPL-MCNC: 9.3 MG/DL — SIGNIFICANT CHANGE UP (ref 8.4–10.5)
CHLORIDE SERPL-SCNC: 103 MMOL/L — SIGNIFICANT CHANGE UP (ref 98–107)
CO2 SERPL-SCNC: 21 MMOL/L — LOW (ref 22–31)
CREAT SERPL-MCNC: 1.04 MG/DL — SIGNIFICANT CHANGE UP (ref 0.5–1.3)
EGFR: 100 ML/MIN/1.73M2 — SIGNIFICANT CHANGE UP
EGFR: 100 ML/MIN/1.73M2 — SIGNIFICANT CHANGE UP
GLUCOSE SERPL-MCNC: 106 MG/DL — HIGH (ref 70–99)
HCT VFR BLD CALC: 43.3 % — SIGNIFICANT CHANGE UP (ref 39–50)
HGB BLD-MCNC: 14.4 G/DL — SIGNIFICANT CHANGE UP (ref 13–17)
MAGNESIUM SERPL-MCNC: 1.9 MG/DL — SIGNIFICANT CHANGE UP (ref 1.6–2.6)
MCHC RBC-ENTMCNC: 28.9 PG — SIGNIFICANT CHANGE UP (ref 27–34)
MCHC RBC-ENTMCNC: 33.3 G/DL — SIGNIFICANT CHANGE UP (ref 32–36)
MCV RBC AUTO: 86.8 FL — SIGNIFICANT CHANGE UP (ref 80–100)
NRBC # BLD AUTO: 0 K/UL — SIGNIFICANT CHANGE UP (ref 0–0)
NRBC # FLD: 0 K/UL — SIGNIFICANT CHANGE UP (ref 0–0)
NRBC BLD AUTO-RTO: 0 /100 WBCS — SIGNIFICANT CHANGE UP (ref 0–0)
PHOSPHATE SERPL-MCNC: 4.2 MG/DL — SIGNIFICANT CHANGE UP (ref 2.5–4.5)
PLATELET # BLD AUTO: 243 K/UL — SIGNIFICANT CHANGE UP (ref 150–400)
POTASSIUM SERPL-MCNC: 4.3 MMOL/L — SIGNIFICANT CHANGE UP (ref 3.5–5.3)
POTASSIUM SERPL-SCNC: 4.3 MMOL/L — SIGNIFICANT CHANGE UP (ref 3.5–5.3)
RBC # BLD: 4.99 M/UL — SIGNIFICANT CHANGE UP (ref 4.2–5.8)
RBC # FLD: 12.5 % — SIGNIFICANT CHANGE UP (ref 10.3–14.5)
SODIUM SERPL-SCNC: 137 MMOL/L — SIGNIFICANT CHANGE UP (ref 135–145)
WBC # BLD: 9.27 K/UL — SIGNIFICANT CHANGE UP (ref 3.8–10.5)
WBC # FLD AUTO: 9.27 K/UL — SIGNIFICANT CHANGE UP (ref 3.8–10.5)

## 2025-04-10 PROCEDURE — 70486 CT MAXILLOFACIAL W/O DYE: CPT | Mod: 26

## 2025-04-10 RX ORDER — CEPHALEXIN 250 MG/1
1 CAPSULE ORAL
Qty: 21 | Refills: 0
Start: 2025-04-10 | End: 2025-04-16

## 2025-04-10 RX ORDER — OXYCODONE HYDROCHLORIDE 30 MG/1
1 TABLET ORAL
Qty: 12 | Refills: 0
Start: 2025-04-10 | End: 2025-04-11

## 2025-04-10 RX ORDER — IBUPROFEN 200 MG
1 TABLET ORAL
Qty: 28 | Refills: 0
Start: 2025-04-10 | End: 2025-04-16

## 2025-04-10 RX ORDER — ACETAMINOPHEN 500 MG/5ML
2 LIQUID (ML) ORAL
Qty: 56 | Refills: 0
Start: 2025-04-10 | End: 2025-04-16

## 2025-04-10 RX ADMIN — OXYCODONE HYDROCHLORIDE 5 MILLIGRAM(S): 30 TABLET ORAL at 09:58

## 2025-04-10 NOTE — PROGRESS NOTE ADULT - SUBJECTIVE AND OBJECTIVE BOX
29y Male HOD3 POD1 s/p L zygomatic arch fx.     S: RAHEL overnight. Pt seen and evaluated bedside this AM. Pt resting comfortably on exam. NPO. Ambulating and voiding without issue. Pain well controlled.    PHYSICAL EXAM:  Gen: Alert and awake. Cooperative. NAD  Head: Atraumatic, left sided dressing in place, L zygoma w/ TTP. Left temporal incision hemostatic, Mild trismus d/t pain. Dentition intact.   Eyes: PERRL, EOMI, vision intact  Ears: No otorrhea, hearing intact.   Nose: No asymmetry, no discharge, no epistasis or septal hematoma.   Neck: Supple, no JVD.   CV: RRR, S1S2 present.   Lungs: No wheezing, rhonchi or rales.   Abdomen: Soft and distended. No rebound or masses.   Skin: Keloid-like scar to the upper back.   Psych: Normal mood and affect.    Vitals:     Vital Signs Last 24 Hrs  T(C): 36.6 (10 Apr 2025 05:30), Max: 36.7 (09 Apr 2025 14:44)  T(F): 97.8 (10 Apr 2025 05:30), Max: 98.1 (09 Apr 2025 14:44)  HR: 60 (10 Apr 2025 05:30) (53 - 71)  BP: 126/69 (10 Apr 2025 05:30) (126/69 - 163/79)  BP(mean): 98 (10 Apr 2025 01:00) (94 - 119)  RR: 18 (10 Apr 2025 05:30) (11 - 20)  SpO2: 99% (10 Apr 2025 05:30) (95% - 100%)    Parameters below as of 10 Apr 2025 05:30  Patient On (Oxygen Delivery Method): room air        I&O's Detail    08 Apr 2025 07:01  -  09 Apr 2025 07:00  --------------------------------------------------------  IN:    IV PiggyBack: 50 mL    Lactated Ringers: 1800 mL    Oral Fluid: 380 mL  Total IN: 2230 mL    OUT:    Stool (mL): 0 mL  Total OUT: 0 mL    Total NET: 2230 mL      09 Apr 2025 07:01  -  10 Apr 2025 06:48  --------------------------------------------------------  IN:    Lactated Ringers: 1500 mL    Oral Fluid: 240 mL  Total IN: 1740 mL    OUT:    Voided (mL): 1100 mL  Total OUT: 1100 mL    Total NET: 640 mL          Medications:    MEDICATIONS  (STANDING):  acetaminophen     Tablet .. 650 milliGRAM(s) Oral every 6 hours  enoxaparin Injectable 40 milliGRAM(s) SubCutaneous every 24 hours  ibuprofen  Tablet. 600 milliGRAM(s) Oral every 6 hours  influenza   Vaccine 0.5 milliLiter(s) IntraMuscular once    MEDICATIONS  (PRN):  benzocaine/menthol Lozenge 1 Lozenge Oral once PRN Sore Throat  HYDROmorphone  Injectable 0.5 milliGRAM(s) IV Push every 10 minutes PRN Severe Pain (7 - 10)  ondansetron    Tablet 4 milliGRAM(s) Oral every 8 hours PRN Nausea and/or Vomiting  oxyCODONE    IR 5 milliGRAM(s) Oral every 4 hours PRN Moderate Pain (4 - 6)  oxyCODONE    IR 10 milliGRAM(s) Oral every 4 hours PRN Severe Pain (7 - 10)  oxyCODONE    IR 5 milliGRAM(s) Oral once PRN Moderate Pain (4 - 6)      Labs:                          14.6   11.06 )-----------( 255      ( 09 Apr 2025 06:10 )             42.7       04-09    138  |  102  |  16  ----------------------------<  110[H]  3.9   |  23  |  1.03    Ca    9.0      09 Apr 2025 06:10  Phos  4.1     04-09  Mg     1.90     04-09

## 2025-04-10 NOTE — DISCHARGE NOTE NURSING/CASE MANAGEMENT/SOCIAL WORK - FINANCIAL ASSISTANCE
[facility] provides services at a reduced cost to those who are determined to be eligible through [facility]’s financial assistance program. Information regarding [facility]’s financial assistance program can be found by going to [email or web address] or by calling [phone].

## 2025-04-10 NOTE — DISCHARGE NOTE NURSING/CASE MANAGEMENT/SOCIAL WORK - PATIENT PORTAL LINK FT
You can access the FollowMyHealth Patient Portal offered by [facility] by registering at the following website: [email or web address]. By joining [facility]’s FollowMyHealth portal, you will also be able to view your health information using other applications (apps) compatible with our system.
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
## 2025-04-21 ENCOUNTER — TRANSCRIPTION ENCOUNTER (OUTPATIENT)
Age: 30
End: 2025-04-21

## 2025-06-27 PROBLEM — Z78.9 OTHER SPECIFIED HEALTH STATUS: Chronic | Status: ACTIVE | Noted: 2024-07-29

## 2025-07-06 ENCOUNTER — OUTPATIENT (OUTPATIENT)
Dept: OUTPATIENT SERVICES | Facility: HOSPITAL | Age: 30
LOS: 1 days | End: 2025-07-06
Payer: COMMERCIAL

## 2025-07-06 DIAGNOSIS — Z00.00 ENCOUNTER FOR GENERAL ADULT MEDICAL EXAMINATION WITHOUT ABNORMAL FINDINGS: ICD-10-CM

## 2025-07-06 PROCEDURE — 70553 MRI BRAIN STEM W/O & W/DYE: CPT

## 2025-07-06 PROCEDURE — A9585: CPT

## (undated) DEVICE — PREP BETADINE KIT

## (undated) DEVICE — SOL IRR POUR NS 0.9% 500ML

## (undated) DEVICE — ELCTR GROUNDING PAD ADULT COVIDIEN

## (undated) DEVICE — BLADE SURGICAL #15 CARBON

## (undated) DEVICE — PACK DENTAL MINOR

## (undated) DEVICE — POSITIONER FOAM EGG CRATE ULNAR 2PCS (PINK)

## (undated) DEVICE — WARMING BLANKET LOWER ADULT

## (undated) DEVICE — VENODYNE/SCD SLEEVE CALF MEDIUM

## (undated) DEVICE — DRAPE 3/4 SHEET 52X76"

## (undated) DEVICE — GLV 8 PROTEXIS (WHITE)

## (undated) DEVICE — WARMING BLANKET FULL UNDERBODY

## (undated) DEVICE — APPLICATOR COTTON TIP 6" STERLE

## (undated) DEVICE — LABELS BLANK W PEN

## (undated) DEVICE — STRYKER COLORADO N-SERIES 3CM STRAIGHT

## (undated) DEVICE — DRAPE TOWEL BLUE 17" X 24"